# Patient Record
Sex: FEMALE | Race: WHITE | Employment: UNEMPLOYED | URBAN - METROPOLITAN AREA
[De-identification: names, ages, dates, MRNs, and addresses within clinical notes are randomized per-mention and may not be internally consistent; named-entity substitution may affect disease eponyms.]

---

## 2023-10-10 LAB
C. TRACHOMATIS, EXTERNAL RESULT: NEGATIVE
HEP B, EXTERNAL RESULT: NEGATIVE
HEPATITIS C ANTIBODY, EXTERNAL RESULT: POSITIVE
HIV, EXTERNAL RESULT: NON REACTIVE
N. GONORRHOEAE, EXTERNAL RESULT: NEGATIVE
RUBELLA TITER, EXTERNAL RESULT: NORMAL
T. PALLIDUM (SYPHILIS) ANTIBODY, EXTERNAL RESULT: NON REACTIVE

## 2024-02-02 LAB — GBS, EXTERNAL RESULT: NEGATIVE

## 2024-02-29 ENCOUNTER — ANESTHESIA EVENT (OUTPATIENT)
Dept: LABOR AND DELIVERY | Age: 34
End: 2024-02-29
Payer: COMMERCIAL

## 2024-02-29 ENCOUNTER — HOSPITAL ENCOUNTER (INPATIENT)
Age: 34
LOS: 2 days | Discharge: HOME | End: 2024-03-02
Attending: OBSTETRICS & GYNECOLOGY | Admitting: OBSTETRICS & GYNECOLOGY
Payer: COMMERCIAL

## 2024-02-29 ENCOUNTER — ANESTHESIA (OUTPATIENT)
Dept: LABOR AND DELIVERY | Age: 34
End: 2024-02-29
Payer: COMMERCIAL

## 2024-02-29 PROBLEM — Z37.9 NORMAL LABOR: Status: ACTIVE | Noted: 2024-02-29

## 2024-02-29 LAB
ABO + RH BLD: NORMAL
AMPHETAMINES UR QL SCN>1000 NG/ML: ABNORMAL
BARBITURATES UR QL SCN>200 NG/ML: ABNORMAL
BENZODIAZ UR QL SCN>200 NG/ML: ABNORMAL
BLD GP AB SCN SERPL QL: NORMAL
BUPRENORPHINE+NOR UR QL SCN: ABNORMAL
CANNABINOIDS UR QL SCN>50 NG/ML: ABNORMAL
COCAINE UR QL SCN: ABNORMAL
DEPRECATED RDW RBC AUTO: 13.2 % (ref 12.4–15.4)
DRUG SCREEN COMMENT UR-IMP: ABNORMAL
FENTANYL SCREEN, URINE: ABNORMAL
HCT VFR BLD AUTO: 37.3 % (ref 36–48)
HGB BLD-MCNC: 12.9 G/DL (ref 12–16)
MCH RBC QN AUTO: 32.1 PG (ref 26–34)
MCHC RBC AUTO-ENTMCNC: 34.5 G/DL (ref 31–36)
MCV RBC AUTO: 93 FL (ref 80–100)
METHADONE UR QL SCN>300 NG/ML: POSITIVE
OPIATES UR QL SCN>300 NG/ML: ABNORMAL
OXYCODONE UR QL SCN: ABNORMAL
PCP UR QL SCN>25 NG/ML: ABNORMAL
PH UR STRIP: 6.5 [PH]
PLATELET # BLD AUTO: 187 K/UL (ref 135–450)
PMV BLD AUTO: 10.9 FL (ref 5–10.5)
RBC # BLD AUTO: 4.01 M/UL (ref 4–5.2)
REAGIN+T PALLIDUM IGG+IGM SERPL-IMP: NORMAL
WBC # BLD AUTO: 12.7 K/UL (ref 4–11)

## 2024-02-29 PROCEDURE — 6370000000 HC RX 637 (ALT 250 FOR IP): Performed by: OBSTETRICS & GYNECOLOGY

## 2024-02-29 PROCEDURE — 6360000002 HC RX W HCPCS

## 2024-02-29 PROCEDURE — 80307 DRUG TEST PRSMV CHEM ANLYZR: CPT

## 2024-02-29 PROCEDURE — 1220000000 HC SEMI PRIVATE OB R&B

## 2024-02-29 PROCEDURE — 7200000001 HC VAGINAL DELIVERY

## 2024-02-29 PROCEDURE — 6360000002 HC RX W HCPCS: Performed by: NURSE ANESTHETIST, CERTIFIED REGISTERED

## 2024-02-29 PROCEDURE — 2580000003 HC RX 258: Performed by: OBSTETRICS & GYNECOLOGY

## 2024-02-29 PROCEDURE — 0HQ9XZZ REPAIR PERINEUM SKIN, EXTERNAL APPROACH: ICD-10-PCS | Performed by: OBSTETRICS & GYNECOLOGY

## 2024-02-29 PROCEDURE — 86780 TREPONEMA PALLIDUM: CPT

## 2024-02-29 PROCEDURE — 85027 COMPLETE CBC AUTOMATED: CPT

## 2024-02-29 PROCEDURE — 2500000003 HC RX 250 WO HCPCS

## 2024-02-29 PROCEDURE — 59025 FETAL NON-STRESS TEST: CPT

## 2024-02-29 PROCEDURE — 51701 INSERT BLADDER CATHETER: CPT

## 2024-02-29 PROCEDURE — 86901 BLOOD TYPING SEROLOGIC RH(D): CPT

## 2024-02-29 PROCEDURE — 86850 RBC ANTIBODY SCREEN: CPT

## 2024-02-29 PROCEDURE — 86900 BLOOD TYPING SEROLOGIC ABO: CPT

## 2024-02-29 PROCEDURE — 2500000003 HC RX 250 WO HCPCS: Performed by: NURSE ANESTHETIST, CERTIFIED REGISTERED

## 2024-02-29 RX ORDER — METHYLERGONOVINE MALEATE 0.2 MG/ML
200 INJECTION INTRAVENOUS PRN
Status: DISCONTINUED | OUTPATIENT
Start: 2024-02-29 | End: 2024-02-29

## 2024-02-29 RX ORDER — SODIUM CHLORIDE, SODIUM LACTATE, POTASSIUM CHLORIDE, AND CALCIUM CHLORIDE .6; .31; .03; .02 G/100ML; G/100ML; G/100ML; G/100ML
500 INJECTION, SOLUTION INTRAVENOUS PRN
Status: DISCONTINUED | OUTPATIENT
Start: 2024-02-29 | End: 2024-02-29

## 2024-02-29 RX ORDER — SODIUM CHLORIDE, SODIUM LACTATE, POTASSIUM CHLORIDE, AND CALCIUM CHLORIDE .6; .31; .03; .02 G/100ML; G/100ML; G/100ML; G/100ML
1000 INJECTION, SOLUTION INTRAVENOUS PRN
Status: DISCONTINUED | OUTPATIENT
Start: 2024-02-29 | End: 2024-02-29

## 2024-02-29 RX ORDER — ACETAMINOPHEN 500 MG
1000 TABLET ORAL EVERY 6 HOURS PRN
Status: DISCONTINUED | OUTPATIENT
Start: 2024-02-29 | End: 2024-03-02 | Stop reason: HOSPADM

## 2024-02-29 RX ORDER — ONDANSETRON 2 MG/ML
4 INJECTION INTRAMUSCULAR; INTRAVENOUS EVERY 6 HOURS PRN
Status: DISCONTINUED | OUTPATIENT
Start: 2024-02-29 | End: 2024-03-02 | Stop reason: HOSPADM

## 2024-02-29 RX ORDER — METHADONE HYDROCHLORIDE 10 MG/5ML
78 SOLUTION ORAL EVERY 4 HOURS PRN
COMMUNITY

## 2024-02-29 RX ORDER — BUPIVACAINE HYDROCHLORIDE 7.5 MG/ML
INJECTION, SOLUTION INTRASPINAL PRN
Status: DISCONTINUED | OUTPATIENT
Start: 2024-02-29 | End: 2024-02-29 | Stop reason: SDUPTHER

## 2024-02-29 RX ORDER — CARBOPROST TROMETHAMINE 250 UG/ML
250 INJECTION, SOLUTION INTRAMUSCULAR PRN
Status: DISCONTINUED | OUTPATIENT
Start: 2024-02-29 | End: 2024-02-29

## 2024-02-29 RX ORDER — TERBUTALINE SULFATE 1 MG/ML
0.25 INJECTION, SOLUTION SUBCUTANEOUS
Status: DISCONTINUED | OUTPATIENT
Start: 2024-02-29 | End: 2024-02-29

## 2024-02-29 RX ORDER — LANOLIN 100 %
OINTMENT (GRAM) TOPICAL PRN
Status: DISCONTINUED | OUTPATIENT
Start: 2024-02-29 | End: 2024-03-02 | Stop reason: HOSPADM

## 2024-02-29 RX ORDER — LIDOCAINE HYDROCHLORIDE 20 MG/ML
INJECTION, SOLUTION EPIDURAL; INFILTRATION; INTRACAUDAL; PERINEURAL PRN
Status: DISCONTINUED | OUTPATIENT
Start: 2024-02-29 | End: 2024-02-29 | Stop reason: SDUPTHER

## 2024-02-29 RX ORDER — NICOTINE 21 MG/24HR
1 PATCH, TRANSDERMAL 24 HOURS TRANSDERMAL DAILY
Status: DISCONTINUED | OUTPATIENT
Start: 2024-02-29 | End: 2024-03-02 | Stop reason: HOSPADM

## 2024-02-29 RX ORDER — FENTANYL CITRATE 50 UG/ML
INJECTION, SOLUTION INTRAMUSCULAR; INTRAVENOUS
Status: DISCONTINUED
Start: 2024-02-29 | End: 2024-02-29 | Stop reason: WASHOUT

## 2024-02-29 RX ORDER — IBUPROFEN 800 MG/1
800 TABLET ORAL EVERY 8 HOURS PRN
Status: DISCONTINUED | OUTPATIENT
Start: 2024-02-29 | End: 2024-03-02 | Stop reason: HOSPADM

## 2024-02-29 RX ORDER — SODIUM CHLORIDE 9 MG/ML
INJECTION, SOLUTION INTRAVENOUS PRN
Status: DISCONTINUED | OUTPATIENT
Start: 2024-02-29 | End: 2024-02-29

## 2024-02-29 RX ORDER — ONDANSETRON 2 MG/ML
4 INJECTION INTRAMUSCULAR; INTRAVENOUS EVERY 6 HOURS PRN
Status: DISCONTINUED | OUTPATIENT
Start: 2024-02-29 | End: 2024-02-29

## 2024-02-29 RX ORDER — LIDOCAINE HYDROCHLORIDE AND EPINEPHRINE 15; 5 MG/ML; UG/ML
INJECTION, SOLUTION EPIDURAL PRN
Status: DISCONTINUED | OUTPATIENT
Start: 2024-02-29 | End: 2024-02-29 | Stop reason: SDUPTHER

## 2024-02-29 RX ORDER — DOCUSATE SODIUM 100 MG/1
100 CAPSULE, LIQUID FILLED ORAL 2 TIMES DAILY
Status: DISCONTINUED | OUTPATIENT
Start: 2024-02-29 | End: 2024-03-02 | Stop reason: HOSPADM

## 2024-02-29 RX ORDER — SODIUM CHLORIDE 0.9 % (FLUSH) 0.9 %
5-40 SYRINGE (ML) INJECTION PRN
Status: DISCONTINUED | OUTPATIENT
Start: 2024-02-29 | End: 2024-03-02 | Stop reason: HOSPADM

## 2024-02-29 RX ORDER — SODIUM CHLORIDE 0.9 % (FLUSH) 0.9 %
5-40 SYRINGE (ML) INJECTION EVERY 12 HOURS SCHEDULED
Status: DISCONTINUED | OUTPATIENT
Start: 2024-02-29 | End: 2024-02-29

## 2024-02-29 RX ORDER — NALOXONE HYDROCHLORIDE 0.4 MG/ML
INJECTION, SOLUTION INTRAMUSCULAR; INTRAVENOUS; SUBCUTANEOUS PRN
Status: DISCONTINUED | OUTPATIENT
Start: 2024-02-29 | End: 2024-02-29 | Stop reason: HOSPADM

## 2024-02-29 RX ORDER — DOCUSATE SODIUM 100 MG/1
100 CAPSULE, LIQUID FILLED ORAL 2 TIMES DAILY
Status: DISCONTINUED | OUTPATIENT
Start: 2024-02-29 | End: 2024-02-29

## 2024-02-29 RX ORDER — MISOPROSTOL 200 UG/1
800 TABLET ORAL PRN
Status: DISCONTINUED | OUTPATIENT
Start: 2024-02-29 | End: 2024-02-29

## 2024-02-29 RX ORDER — SODIUM CHLORIDE 9 MG/ML
INJECTION, SOLUTION INTRAVENOUS PRN
Status: DISCONTINUED | OUTPATIENT
Start: 2024-02-29 | End: 2024-03-02 | Stop reason: HOSPADM

## 2024-02-29 RX ORDER — SODIUM CHLORIDE, SODIUM LACTATE, POTASSIUM CHLORIDE, CALCIUM CHLORIDE 600; 310; 30; 20 MG/100ML; MG/100ML; MG/100ML; MG/100ML
INJECTION, SOLUTION INTRAVENOUS CONTINUOUS
Status: DISCONTINUED | OUTPATIENT
Start: 2024-02-29 | End: 2024-02-29

## 2024-02-29 RX ORDER — SODIUM CHLORIDE 0.9 % (FLUSH) 0.9 %
5-40 SYRINGE (ML) INJECTION EVERY 12 HOURS SCHEDULED
Status: DISCONTINUED | OUTPATIENT
Start: 2024-02-29 | End: 2024-03-02 | Stop reason: HOSPADM

## 2024-02-29 RX ORDER — ACETAMINOPHEN 325 MG/1
650 TABLET ORAL EVERY 4 HOURS PRN
Status: DISCONTINUED | OUTPATIENT
Start: 2024-02-29 | End: 2024-02-29

## 2024-02-29 RX ORDER — SODIUM CHLORIDE 0.9 % (FLUSH) 0.9 %
5-40 SYRINGE (ML) INJECTION PRN
Status: DISCONTINUED | OUTPATIENT
Start: 2024-02-29 | End: 2024-02-29

## 2024-02-29 RX ADMIN — DOCUSATE SODIUM 100 MG: 100 CAPSULE, LIQUID FILLED ORAL at 21:06

## 2024-02-29 RX ADMIN — IBUPROFEN 800 MG: 800 TABLET, FILM COATED ORAL at 11:26

## 2024-02-29 RX ADMIN — LIDOCAINE HYDROCHLORIDE 5 ML: 20 INJECTION, SOLUTION EPIDURAL; INFILTRATION; INTRACAUDAL; PERINEURAL at 08:35

## 2024-02-29 RX ADMIN — ACETAMINOPHEN 1000 MG: 500 TABLET ORAL at 11:26

## 2024-02-29 RX ADMIN — IBUPROFEN 800 MG: 800 TABLET, FILM COATED ORAL at 21:06

## 2024-02-29 RX ADMIN — Medication 87.3 MILLI-UNITS/MIN: at 08:59

## 2024-02-29 RX ADMIN — Medication 10 UNITS: at 09:00

## 2024-02-29 RX ADMIN — SODIUM CHLORIDE, POTASSIUM CHLORIDE, SODIUM LACTATE AND CALCIUM CHLORIDE 1000 ML: 600; 310; 30; 20 INJECTION, SOLUTION INTRAVENOUS at 07:40

## 2024-02-29 RX ADMIN — BUPIVACAINE HYDROCHLORIDE IN DEXTROSE 1 ML: 7.5 INJECTION, SOLUTION SUBARACHNOID at 07:59

## 2024-02-29 RX ADMIN — LIDOCAINE HYDROCHLORIDE AND EPINEPHRINE 3 ML: 15; 5 INJECTION, SOLUTION EPIDURAL at 08:09

## 2024-02-29 ASSESSMENT — PAIN SCALES - GENERAL
PAINLEVEL_OUTOF10: 2
PAINLEVEL_OUTOF10: 3
PAINLEVEL_OUTOF10: 5

## 2024-02-29 ASSESSMENT — PAIN DESCRIPTION - PAIN TYPE
TYPE: ACUTE PAIN
TYPE: ACUTE PAIN

## 2024-02-29 ASSESSMENT — PAIN DESCRIPTION - LOCATION
LOCATION: PERINEUM
LOCATION: PERINEUM
LOCATION: ABDOMEN

## 2024-02-29 ASSESSMENT — PAIN DESCRIPTION - DESCRIPTORS
DESCRIPTORS: DULL;BURNING
DESCRIPTORS: BURNING;SHARP
DESCRIPTORS: CRAMPING

## 2024-02-29 ASSESSMENT — PAIN DESCRIPTION - ORIENTATION
ORIENTATION: MID;INNER
ORIENTATION: LOWER
ORIENTATION: MID;INNER

## 2024-02-29 ASSESSMENT — ENCOUNTER SYMPTOMS: SHORTNESS OF BREATH: 0

## 2024-02-29 ASSESSMENT — PAIN - FUNCTIONAL ASSESSMENT
PAIN_FUNCTIONAL_ASSESSMENT: ACTIVITIES ARE NOT PREVENTED

## 2024-02-29 ASSESSMENT — PAIN DESCRIPTION - FREQUENCY
FREQUENCY: INTERMITTENT
FREQUENCY: INTERMITTENT

## 2024-02-29 ASSESSMENT — PAIN DESCRIPTION - ONSET
ONSET: PROGRESSIVE
ONSET: PROGRESSIVE

## 2024-02-29 NOTE — CARE COORDINATION
Case Management Mom/Baby Assessment    Identifying Information    Mother of Baby: Zari Veliz   Mother's :1990    Father of Baby:Donte Veliz  Father's :     Baby's Name: Esvin Veliz  Delivery Date:24   Baby's Weight: 8lb  15.6oz  Apgar :8/9  Nursing concerns for baby:None  Week Gestation: 39 weeks 5 Days  Prenatal Care:[facility]  Baby's Urine or Cord Drug Screen Yes__x_  No___ Results: Positive for Methadone  PCP for baby: ANDREE Smith  Date of appt:         Time of Appt:    Reasoning for Referral: MOB has history of heroin abuse, on Methadone for the past 3 yrs.      Assessment Information    Discharge Address:[address] [place] [address]  Phone:[phone]    Resides with:FOB and their children    Emergency Contact:Cynthia Logan Phone:[phone]    Support System: and family    Other Children    Name:Tushar Veliz  :2020  Name:Donte Veliz Jr  :2021  Name:Linda Veliz :10/24/2022    MOB has 10 yr old Rolo Morris 2013( has a few days a week)    Custody:has custody of her 4 Lay children, and shared with her 10 yr old Rolo.    Father of Baby Involvement:Lives with MOB and their children.    Have you ever had contact with Children's Services (describe): was involved when Tushar was born and has been sober over 3 yrs.    Supplies: Requested assistance with car seat  Car Seat  Diapers  Crib/Bassinet  Feeding  [place]        Resources:  [facility]- has  Medicaid  Food [place]  [employer]ucceed[employer]- active with [employer]  Transportation   Cash Assistance     Education last grade completed 11th grade  Occupation not working    History   Domestic Abuse:No  Physical Abuse: No  Sexual Abuse:No  Drug Abuse/Prescription Medication:hx of heroin usage- on Methadone for over 3 yrs.  Depression:had post partum depression after the birth of her 2 yr old.  Medication/Counseling: [facility] has counselor and daily Methadone.  Does MOB have Medical Marijuana card?

## 2024-02-29 NOTE — ANESTHESIA PROCEDURE NOTES
CSE Block    Patient location during procedure: OB  Start time: 2/29/2024 7:59 AM  End time: 2/29/2024 8:13 AM  Reason for block: primary anesthetic  Staffing  Performed: resident/CRNA   Anesthesiologist: Chris Marin MD  Resident/CRNA: Rupal Toro APRN - CRNA  Performed by: Rupal Toro APRN - CRNA  Authorized by: Chris Marin MD    CSE  Patient position: sitting  Prep: ChloraPrep and site prepped and draped  Patient monitoring: continuous pulse ox and frequent blood pressure checks  Approach: midline  Provider prep: mask and sterile gloves  Spinal Needle  Needle type: pencil-tip   Needle gauge: 25 G  Needle length: 3.5 in  Epidural Needle  Injection technique: MELODIE saline  Needle type: Tuohy   Needle gauge: 17 G  Needle length: 3.5 in  Needle insertion depth: 7 cm  Location: lumbar (1-5)  Catheter  Catheter type: side hole  Catheter size: 19 G  Catheter at skin depth: 12 cm  Test dose: negative  [id number]  Hemodynamics: stable  Additional Notes  Consent:  Risks, benefits, and alternatives of neuraxial anesthesia were discussed and the patient was given the opportunity to ask questions. Informed consent was obtained.    Timeout:  A \"time out\" was performed immediately prior to the start of the epidural procedure.  The patient, site, procedure and provider were correctly identified.  Allergies were reviewed.  All members of the team and the patient participated in the time out.      Procedure:   Skin Local: Lidocaine 1% 2 mL Interspace: L3-L4    Technique: Loss of resistance with 1 mL of normal saline to expand the epidural space.  Spinal Needle: 25g Pencan  Paresthesia: denies    CSF:  positive    Heme: negative   SAB Medication: 0.75% bupivacaine  1 mL  Spinal needle removed and epidural catheter threaded without difficulty.  Aspiration:  CSF  negative  /Heme: negative   Dressing: Occlusive; steri strips, tegaderm and tape applied using aseptic technique.    Attempts: 1     Difficulties/Complications:

## 2024-02-29 NOTE — FLOWSHEET NOTE
Recovery completed. Vitals stable. Bleeding scant/small -1/u and firm. Pt reports some mild cramping rated 3/10 and agreeable to take motrin and tylenol at this time. Pt legs still numb/heavy and pt cannot lift off bed but does have improved movement. Pt eating without n/v. Pt denies needs at this time. Infant remains latched/eating. Call light in reach and spouse at bedside.

## 2024-02-29 NOTE — H&P
Department of Obstetrics and Gynecology   Obstetrics History and Physical        CHIEF COMPLAINT:  contractions    HISTORY OF PRESENT ILLNESS:    Zari Veliz  is a 34 y.o.  female at 39w5d presents with a chief complaint as above and is being admitted for active phase labor. Patient came in via EMS for contractions. Receives her care at [facility] but was too far away to make it to the hospital. Reports uncomplicated pregnancy and prior  x4. Hx of heroin but has been clean for 3 years, currently on methadone.    Estimated Due Date: Estimated Date of Delivery: 3/2/24    PRENATAL CARE: Complicated by: none    PAST OB HISTORY:  OB History          5    Para   4    Term   4            AB        Living   4         SAB        IAB        Ectopic        Molar        Multiple        Live Births   4              Past Medical History:        Diagnosis Date    Hepatitis C      Past Surgical History:    No past surgical history on file.  Allergies:  Patient has no known allergies.  Social History:    Social History     Socioeconomic History    Marital status:      Spouse name: Not on file    Number of children: Not on file    Years of education: Not on file    Highest education level: Not on file   Occupational History    Not on file   Tobacco Use    Smoking status: Former     Types: Cigarettes    Smokeless tobacco: Not on file   Vaping Use    Vaping Use: Every day    Devices: Disposable   Substance and Sexual Activity    Alcohol use: No    Drug use: Not Currently     Comment: Heroin - clean 3 years    Sexual activity: Yes     Partners: Male   Other Topics Concern    Not on file   Social History Narrative    Not on file     Social Determinants of Health     Financial Resource Strain: Not on file   Food Insecurity: Not on file   Transportation Needs: Not on file   Physical Activity: Not on file   Stress: Not on file   Social Connections: Not on file   Intimate Partner Violence: Not on file   Housing

## 2024-02-29 NOTE — FLOWSHEET NOTE
viable male infant at 0850. Infant currently skin to skin with mom and nursing. Vitals stable. Infant voided at delivery and had meconium stained fluid. Maternal vitals stable. 1st degree repair with 1 stitch. Fundus firm u/u. Plan of care reviewed and mother verbalized understanding. Recovery period started.

## 2024-02-29 NOTE — FLOWSHEET NOTE
Fundus boggy but quickly firmed with massage down to -1/u. Moderate sized clot and some bleeding noted. Pads weighed and approx 335 - total QBL now 485. Pads changed. Will monitor. BP 90/51 but pt asymptomatic. Charge BRENT Clark notified.

## 2024-02-29 NOTE — ANESTHESIA PRE PROCEDURE
Evaluation  Patient summary reviewed and Nursing notes reviewed   no history of anesthetic complications:   Airway: Mallampati: II  TM distance: >3 FB   Neck ROM: full  Mouth opening: > = 3 FB   Dental: normal exam         Pulmonary:Negative Pulmonary ROS and normal exam  breath sounds clear to auscultation      (-) asthma, shortness of breath and recent URI                           Cardiovascular:Negative CV ROS  Exercise tolerance: good (>4 METS)      (-) hypertension and CAD      Rhythm: regular  Rate: normal                    Neuro/Psych:   Negative Neuro/Psych ROS  (+) psychiatric history:            GI/Hepatic/Renal: Neg GI/Hepatic/Renal ROS  (+) hepatitis: C     (-) GERD       Endo/Other: Negative Endo/Other ROS       (-) diabetes mellitus                ROS comment: Hx of heroin abuse. Takes 78mg methadone daily. Follows with clinic.   Abdominal:             Vascular: negative vascular ROS.    - DVT and PE.      Other Findings:         Anesthesia Plan      CSE     ASA 2     (Plan for a labor CSE. Plan and risks discussed with patient including but not limited to changes in HR, B/P and oxygen status; N/V; infection; headache; inadequate block or need to replace epidural. Questions answered. Patient agrees to POC.         )        Anesthetic plan and risks discussed with patient.        Attending anesthesiologist reviewed and agrees with Preprocedure content        OB History          5    Para   4    Term   4            AB        Living   4         SAB        IAB        Ectopic        Molar        Multiple        Live Births   4                Zari Veliz is a 34 y.o. year-old female  admitted to Jaycee Gilliam MD for contractions and active labor. CVE per RN: 10/100/0. Pt requests epidural. Gestational age is 39w5d. Her Body mass index is 27.28 kg/m².     She was seen, examined and her chart was reviewed (including anesthesia, drug and allergy history).  No interval changes are noted

## 2024-02-29 NOTE — FLOWSHEET NOTE
Pt up to bathroom x2 standby assist @ 1245. Legs still tingly but able to lift off bed. Bleeding remains scant and fundus firm. Epi catheter removed with tip intact. Pt unable to void. Lurdes care instructions provided. New gown and pad/ice pack on. Pt to wheelchair and moved to  room 2263. Oriented to room/call light. Pt encouraged to call for assist up to bathroom as still tingly. Call light in reach and pt verbalized understanding.

## 2024-02-29 NOTE — FLOWSHEET NOTE
Patient desires epidural.  Anesthesia aware and at bedside.  Patient agreeable for procedure, verbal consent obtained and timeout performed.

## 2024-02-29 NOTE — FLOWSHEET NOTE
Patient laying back from epidural, placed in right tilt while epidural sets up. Patient is complete but does not feel pressure at this time.

## 2024-02-29 NOTE — L&D DELIVERY NOTE
Department of Obstetrics and Gynecology  Spontaneous Vaginal Delivery Note      Zari Veliz,[id number]    PRENATAL CARE: Complicated by: Methadone use    Pre-operative Diagnosis:  Term pregnancy, Single fetus, active labor, and SROM      Post-operative Diagnosis: The same    Additional Procedures: perineal laceration repair     Information for the patient's :  Jerman Veliz [[id number]]                  Infant Wt:   Information for the patient's :  Jerman Veliz [[id number]]          APGARS:     Information for the patient's :  Jerman Veliz [[id number]]         Anesthesia:  epidural anesthesia    Specimen:  Placenta sent to pathology No    Estimated blood loss: 150 cc     Condition: mother and infant stable in LDR    Infant Feeding: breast    Delivery Summary: Patient is Zari Veliz is a 34 y.o.  female at 39w5d admitted to Labor and Delivery room and placed on external monitors. Contractions were regular, FHT was reactive with moderate variability without decels. Patient did received epidural anesthesia.   Labor progressed as anticipated to fully dilated cervix. With subsequent contractions she started pushing and delivered vaginally spontaneously with no complications. 30 Units of Pitocin in 500 ml of LR was started IV. Placenta, cord and membranes were delivered spontaneously within less than 15 minutes. Uterus was not explored.  Vaginal walls, cervix, perineum, rectum were not intact on inspection. The cervix, vagina and perineum were examined and first degree perineal laceration was repaired in the regular fashion with Vicryl. Uterus was well contracted.Vaginal sweep was done, laps count was correct. Mother and  left stable to recovery.     Electronically signed by Jaycee Gilliam MD on 2024 at 9:09 AM

## 2024-02-29 NOTE — ANESTHESIA POSTPROCEDURE EVALUATION
Department of Anesthesiology  Postprocedure Note    Patient: Zari Veliz  MRN: [id number]  YOB: 1990  Date of evaluation: 2/29/2024    Procedure Summary       Date: 02/29/24 Room / Location:     Anesthesia Start: 0750 Anesthesia Stop: 0850    Procedure: Labor Analgesia Diagnosis:     Scheduled Providers:  Responsible Provider: Chris Marin MD    Anesthesia Type: CSE ASA Status: 2            Anesthesia Type: CSE      Anesthesia Post Evaluation    Patient location during evaluation: bedside  Patient participation: complete - patient participated  Level of consciousness: awake and alert  Pain score: 1  Airway patency: patent  Nausea & Vomiting: no nausea and no vomiting  Cardiovascular status: hemodynamically stable  Respiratory status: room air  Hydration status: stable  Multimodal analgesia pain management approach  Pain management: satisfactory to patient and adequate    /61   Pulse 74   Temp 36.7 °C (98 °F) (Oral)   Resp 16   Ht 1.6 m (5' 3\")   Wt 69.9 kg (154 lb)   SpO2 98%   Breastfeeding Unknown   BMI 27.28 kg/m²       No notable events documented.

## 2024-02-29 NOTE — FLOWSHEET NOTE
Patient to unit from squad to the ED, Patient taken to room 2287 due to patient uncomfortable and stating her water broke. Originally ED stated that Patient was 32 weeks but upon arriving patient states due date is March 2nd. Dr Gilliam notified.

## 2024-03-01 PROCEDURE — 6370000000 HC RX 637 (ALT 250 FOR IP): Performed by: OBSTETRICS & GYNECOLOGY

## 2024-03-01 PROCEDURE — 1220000000 HC SEMI PRIVATE OB R&B

## 2024-03-01 RX ORDER — SIMETHICONE 80 MG
80 TABLET,CHEWABLE ORAL EVERY 6 HOURS PRN
Status: DISCONTINUED | OUTPATIENT
Start: 2024-03-01 | End: 2024-03-02 | Stop reason: HOSPADM

## 2024-03-01 RX ORDER — CALCIUM CARBONATE 500 MG/1
500 TABLET, CHEWABLE ORAL 3 TIMES DAILY PRN
Status: DISCONTINUED | OUTPATIENT
Start: 2024-03-01 | End: 2024-03-02 | Stop reason: HOSPADM

## 2024-03-01 RX ADMIN — ACETAMINOPHEN 1000 MG: 500 TABLET ORAL at 16:34

## 2024-03-01 RX ADMIN — IBUPROFEN 800 MG: 800 TABLET, FILM COATED ORAL at 13:04

## 2024-03-01 RX ADMIN — ACETAMINOPHEN 1000 MG: 500 TABLET ORAL at 00:39

## 2024-03-01 RX ADMIN — IBUPROFEN 800 MG: 800 TABLET, FILM COATED ORAL at 20:09

## 2024-03-01 RX ADMIN — DOCUSATE SODIUM 100 MG: 100 CAPSULE, LIQUID FILLED ORAL at 09:49

## 2024-03-01 RX ADMIN — ANTACID TABLETS 500 MG: 500 TABLET, CHEWABLE ORAL at 19:26

## 2024-03-01 RX ADMIN — IBUPROFEN 800 MG: 800 TABLET, FILM COATED ORAL at 05:12

## 2024-03-01 RX ADMIN — DOCUSATE SODIUM 100 MG: 100 CAPSULE, LIQUID FILLED ORAL at 20:09

## 2024-03-01 RX ADMIN — SIMETHICONE 80 MG: 80 TABLET, CHEWABLE ORAL at 02:15

## 2024-03-01 ASSESSMENT — PAIN DESCRIPTION - ORIENTATION
ORIENTATION: LOWER
ORIENTATION: LOWER
ORIENTATION: MID
ORIENTATION: MID
ORIENTATION: LOWER
ORIENTATION: LOWER

## 2024-03-01 ASSESSMENT — PAIN DESCRIPTION - ONSET
ONSET: ON-GOING
ONSET: PROGRESSIVE
ONSET: ON-GOING

## 2024-03-01 ASSESSMENT — PAIN DESCRIPTION - FREQUENCY
FREQUENCY: INTERMITTENT

## 2024-03-01 ASSESSMENT — PAIN - FUNCTIONAL ASSESSMENT
PAIN_FUNCTIONAL_ASSESSMENT: ACTIVITIES ARE NOT PREVENTED

## 2024-03-01 ASSESSMENT — PAIN SCALES - GENERAL
PAINLEVEL_OUTOF10: 6
PAINLEVEL_OUTOF10: 0
PAINLEVEL_OUTOF10: 1
PAINLEVEL_OUTOF10: 1
PAINLEVEL_OUTOF10: 4
PAINLEVEL_OUTOF10: 2
PAINLEVEL_OUTOF10: 0
PAINLEVEL_OUTOF10: 0

## 2024-03-01 ASSESSMENT — PAIN DESCRIPTION - DESCRIPTORS
DESCRIPTORS: BURNING;SORE
DESCRIPTORS: CRAMPING
DESCRIPTORS: CRAMPING
DESCRIPTORS: SORE
DESCRIPTORS: CRAMPING
DESCRIPTORS: CRAMPING

## 2024-03-01 ASSESSMENT — PAIN DESCRIPTION - PAIN TYPE
TYPE: ACUTE PAIN

## 2024-03-01 ASSESSMENT — PAIN DESCRIPTION - LOCATION
LOCATION: ABDOMEN
LOCATION: ABDOMEN
LOCATION: PERINEUM
LOCATION: BACK
LOCATION: BACK
LOCATION: PERINEUM

## 2024-03-01 ASSESSMENT — PAIN DESCRIPTION - RADICULAR PAIN: RADICULAR_PAIN: ABSENT

## 2024-03-01 NOTE — FLOWSHEET NOTE
RN at bedside for shift report with BRENT Hameed. Whiteboard updated, POC discussed, Pt. Verbalized understanding. This RN to take over care at this time

## 2024-03-01 NOTE — PLAN OF CARE
Problem: Pain  Goal: Verbalizes/displays adequate comfort level or baseline comfort level  3/1/2024 1456 by Jennifer Najera, RN  Outcome: Progressing  3/1/2024 0532 by Breann Van, RN  Outcome: Progressing  Flowsheets  Taken 3/1/2024 0512  Verbalizes/displays adequate comfort level or baseline comfort level:   Encourage patient to monitor pain and request assistance   Assess pain using appropriate pain scale   Administer analgesics based on type and severity of pain and evaluate response   Implement non-pharmacological measures as appropriate and evaluate response   Consider cultural and social influences on pain and pain management   Notify Licensed Independent Practitioner if interventions unsuccessful or patient reports new pain  Taken 3/1/2024 0039  Verbalizes/displays adequate comfort level or baseline comfort level:   Encourage patient to monitor pain and request assistance   Assess pain using appropriate pain scale   Administer analgesics based on type and severity of pain and evaluate response   Implement non-pharmacological measures as appropriate and evaluate response   Consider cultural and social influences on pain and pain management   Notify Licensed Independent Practitioner if interventions unsuccessful or patient reports new pain  Taken 2/29/2024 2014  Verbalizes/displays adequate comfort level or baseline comfort level:   Encourage patient to monitor pain and request assistance   Assess pain using appropriate pain scale   Administer analgesics based on type and severity of pain and evaluate response   Implement non-pharmacological measures as appropriate and evaluate response   Consider cultural and social influences on pain and pain management   Notify Licensed Independent Practitioner if interventions unsuccessful or patient reports new pain     Problem: Postpartum  Goal: Experiences normal postpartum course  Description:  Postpartum OB-Pregnancy care plan goal which identifies if the mother

## 2024-03-01 NOTE — PLAN OF CARE
Problem: Pain  Goal: Verbalizes/displays adequate comfort level or baseline comfort level  Outcome: Progressing  Flowsheets  Taken 3/1/2024 0512  Verbalizes/displays adequate comfort level or baseline comfort level:   Encourage patient to monitor pain and request assistance   Assess pain using appropriate pain scale   Administer analgesics based on type and severity of pain and evaluate response   Implement non-pharmacological measures as appropriate and evaluate response   Consider cultural and social influences on pain and pain management   Notify Licensed Independent Practitioner if interventions unsuccessful or patient reports new pain  Taken 3/1/2024 0039  Verbalizes/displays adequate comfort level or baseline comfort level:   Encourage patient to monitor pain and request assistance   Assess pain using appropriate pain scale   Administer analgesics based on type and severity of pain and evaluate response   Implement non-pharmacological measures as appropriate and evaluate response   Consider cultural and social influences on pain and pain management   Notify Licensed Independent Practitioner if interventions unsuccessful or patient reports new pain  Taken 2/29/2024 2014  Verbalizes/displays adequate comfort level or baseline comfort level:   Encourage patient to monitor pain and request assistance   Assess pain using appropriate pain scale   Administer analgesics based on type and severity of pain and evaluate response   Implement non-pharmacological measures as appropriate and evaluate response   Consider cultural and social influences on pain and pain management   Notify Licensed Independent Practitioner if interventions unsuccessful or patient reports new pain     Problem: Postpartum  Goal: Experiences normal postpartum course  Description:  Postpartum OB-Pregnancy care plan goal which identifies if the mother is experiencing a normal postpartum course  Outcome: Progressing  Goal: Appropriate maternal -

## 2024-03-01 NOTE — DISCHARGE SUMMARY
Department of Obstetrics and Gynecology  Postpartum Discharge Summary      Admit Date: 2024    Admit Diagnosis: Normal labor @ 39 5/7 w, methadone maintenance     Discharge Date: 2024  Any delay in discharge from ordered D/C date due to  factors.    Discharge Diagnoses: spontaneous vaginal delivery       Medication List        CONTINUE taking these medications      methadone 10 MG/5ML solution              Service: Obstetrics    Consults: none    Significant Diagnostic Studies: none    Postpartum complications: none     Condition at Discharge: good    Hospital Course: uncomplicated    Discharge Instructions:     Activity: as tolerated    Diet: regular diet    Instructions: No intercourse and nothing in the vagina for 6 weeks. Do not drive while using pain medications. Keep any wounds clean and dry    Discharge to: Home    Disposition / Follow up: Return to office in 6 weeks    Home Health Nurse visit within 24-48 h if qualifies    [place] Data:  Apgars:  Information for the patient's :  Jerman Veliz [[id number]]   APGAR One: 8   Information for the patient's :  Jerman Veliz [[id number]]   APGAR Five: 9   Birth Weight:  Information for the patient's :  Jerman Veliz [[id number]]   Birth Weight: 4.07 kg (8 lb 15.6 oz)   Home with mother    Electronically signed by Gabby Arellano MD on 3/1/2024 at 2:57 PM

## 2024-03-01 NOTE — PROGRESS NOTES
Department of Obstetrics and Gynecology  Vaginal Delivery Postpartum Rounds    SUBJECTIVE:  Pain is controlled with non-steroidal anti-inflammatory drugs. Her lochia is normal.    OBJECTIVE:  Vital Signs: /69   Pulse 83   Temp 98.4 °F (36.9 °C) (Oral)   Resp 18   Ht 1.6 m (5' 3\")   Wt 69.9 kg (154 lb)   SpO2 98%   Breastfeeding Unknown   BMI 27.28 kg/m²   Appearance/Psychiatric: awake, alert, cooperative, no apparent distress, appears stated age  Constitutional: The patient is well nourished.  Cardiovascular: She does not have edema.  Respiratory: Respiratory effort is normal.  Gastrointestinal: Soft, non tender, uterine fundus is firm below umbilicus  Extremities: nontender to palpation  Perineum: intact     LABS / IMAGING:      Lab Results   Component Value Date/Time    WBC 12.7 02/29/2024 07:40 AM    RBC 4.01 02/29/2024 07:40 AM    HGB 12.9 02/29/2024 07:40 AM    HCT 37.3 02/29/2024 07:40 AM    MCV 93.0 02/29/2024 07:40 AM    MCH 32.1 02/29/2024 07:40 AM    MCHC 34.5 02/29/2024 07:40 AM    RDW 13.2 02/29/2024 07:40 AM     02/29/2024 07:40 AM    MPV 10.9 02/29/2024 07:40 AM     Lab Results   Component Value Date/Time     07/22/2010 04:30 AM    K 4.0 07/22/2010 04:30 AM     07/22/2010 04:30 AM    CO2 23 07/22/2010 04:30 AM    BUN 11 07/22/2010 04:30 AM    CREATININE 0.9 07/22/2010 04:30 AM    GLUCOSE 109 07/22/2010 04:30 AM    CALCIUM 9.8 07/22/2010 04:30 AM     No results found for: \"POCGLU\"  Lab Results   Component Value Date/Time    ALKPHOS 76 07/22/2010 04:30 AM    ALT 14 07/22/2010 04:30 AM    AST 23 07/22/2010 04:30 AM    PROT 7.9 07/22/2010 04:30 AM    BILITOT 0.90 07/22/2010 04:30 AM    LABALBU 4.5 07/22/2010 04:30 AM     Lab Results   Component Value Date/Time    COLORU Yellow 11/29/2013 03:53 PM    CLARITYU Clear 11/29/2013 03:53 PM    SPECGRAV 1.025 11/29/2013 03:53 PM    MUCUS Trace 07/22/2010 05:45 AM    PHUR 6.5 02/29/2024 09:05 AM    WBCUA 10-15 07/22/2010 05:45 AM

## 2024-03-01 NOTE — CARE COORDINATION
MOB asked for assistance getting Car Seat.  SW made referral to  Outreach Program(POP).  POP will deliver Car Seat to patient on Monday prior to discharge.

## 2024-03-01 NOTE — PROGRESS NOTES
[facility] Department of Anesthesiology  Post-Anesthesia Note       Name:  Zari Veliz                                         Age:  34 y.o.  MRN:  [id number]   39w5d  OB History          5    Para   5    Term   5            AB        Living   5         SAB        IAB        Ectopic        Molar        Multiple   0    Live Births   5                Anesthesia Post Op Pain Management: yes -       Zari Veliz was evaluated on postpartum day 1.  She expresses no concerns regarding her anesthesia care at this time.        Last Vitals & Oxygen Saturation: /69   Pulse 83   Temp 36.9 °C (98.4 °F) (Oral)   Resp 18   Ht 1.6 m (5' 3\")   Wt 69.9 kg (154 lb)   SpO2 98%   Breastfeeding Unknown   BMI 27.28 kg/m²   Patient Vitals for the past 4 hrs:   BP Temp Temp src Pulse Resp SpO2   24 0753 118/69 36.9 °C (98.4 °F) Oral 83 18 --   24 0512 121/74 36.6 °C (97.8 °F) Oral 74 16 98 %       Anesthesia: epidural    Level of consciousness: awake, alert, and oriented    Respiratory: stable     Cardiovascular: stable     Hydration: stable     PONV:  none    Pruritis: mild    Post Dural Puncture Headache: denies    Post-op pain: adequate analgesia    Out of bed and ambulating without difficulty: Yes    Post-op assessment: no apparent anesthetic complications and tolerated procedure well    Complications:   none      MARLYS Benjamin - CRNA  2024   7:57 AM

## 2024-03-02 VITALS
TEMPERATURE: 98 F | HEART RATE: 83 BPM | HEIGHT: 63 IN | DIASTOLIC BLOOD PRESSURE: 85 MMHG | WEIGHT: 154 LBS | SYSTOLIC BLOOD PRESSURE: 114 MMHG | OXYGEN SATURATION: 98 % | RESPIRATION RATE: 18 BRPM | BODY MASS INDEX: 27.29 KG/M2

## 2024-03-02 PROCEDURE — 6370000000 HC RX 637 (ALT 250 FOR IP): Performed by: OBSTETRICS & GYNECOLOGY

## 2024-03-02 RX ADMIN — IBUPROFEN 800 MG: 800 TABLET, FILM COATED ORAL at 08:10

## 2024-03-02 RX ADMIN — DOCUSATE SODIUM 100 MG: 100 CAPSULE, LIQUID FILLED ORAL at 08:10

## 2024-03-02 ASSESSMENT — PAIN SCALES - GENERAL: PAINLEVEL_OUTOF10: 0

## 2024-03-02 NOTE — DISCHARGE INSTRUCTIONS
Follow-up with your OB doctor as specified in 6 weeks.        For Breastfeeding moms, you can contact our lactation specialists with any problems or questions you may have.  [facility] lactation [id number]      DIET  Eat a well balanced diet focusing on foods high in fiber and protein.   Drink plenty of fluids especially water.  To avoid constipation you may take a mild stool softener as recommended by your doctor or midwife.    ACTIVITY  Gradually increase your activity.  Resume exercise regimen only after advise by your doctor or midwife.  Avoid lifting anything heavier than your baby or a gallon of milk for SIX weeks.   Avoid driving until your doctor or midwife has given their approval.  Rise slowly from a lying to sitting and then a standing position.  Climb stairs one at a time.  Use caution when carrying your baby up and down the stairs.  NO SEXUAL Activity for 4-6 weeks or until advised by your doctor; Nothing in vagina: intercourse, tampons, or douching.   Be prepared to discuss family planning at your follow-up OB visit.   You may feel tired or have a lack of energy.  You may continue your prenatal vitamin to replenish nutrients post delivery.  Nap when baby naps to catch up on sleep.     EMOTIONS  You may feed rolon, sad, teary, & overwhelmed.  Contact your OB provider if you feel you may be showing signs of postpartum depression, or have thoughts of harming yourself or your infant.  If infant will not stop crying, contact another adult for help or place infant in their crib on their back and take a break.  NEVER shake your infant.      BLEEDING  Vaginal bleeding will decrease in amount over the next few weeks.  You will notice that as your activity increases, your flow may increase.  This is your body's way of telling you, you need take things easier and rest more often.  Call your OB/ER if you are saturating more than one maxi pad in an hour & resting does help.    BREAST CARE  Take medications as

## 2024-03-02 NOTE — PLAN OF CARE
Problem: Pain  Goal: Verbalizes/displays adequate comfort level or baseline comfort level  3/1/2024 2128 by Emy Santoyo RN  Outcome: Progressing     Problem: Postpartum  Goal: Experiences normal postpartum course  Description:  Postpartum OB-Pregnancy care plan goal which identifies if the mother is experiencing a normal postpartum course  3/1/2024 2128 by Emy Santoyo RN  Outcome: Progressing     Problem: Postpartum  Goal: Appropriate maternal -  bonding  Description:  Postpartum OB-Pregnancy care plan goal which identifies if the mother and  are bonding appropriately  3/1/2024 2128 by Emy Santoyo RN  Outcome: Progressing     Problem: Postpartum  Goal: Establishment of infant feeding pattern  Description:  Postpartum OB-Pregnancy care plan goal which identifies if the mother is establishing a feeding pattern with their   3/1/2024 2128 by Emy Snatoyo RN  Outcome: Progressing     Problem: Postpartum  Goal: Incisions, wounds, or drain sites healing without S/S of infection  3/1/2024 2128 by Emy Santoyo RN  Outcome: Progressing     Problem: Infection - Adult  Goal: Absence of infection at discharge  3/1/2024 2128 by Emy Santoyo RN  Outcome: Progressing     Problem: Infection - Adult  Goal: Absence of infection during hospitalization  3/1/2024 2128 by Emy Santoyo RN  Outcome: Progressing     Problem: Infection - Adult  Goal: Absence of fever/infection during anticipated neutropenic period  3/1/2024 2128 by Emy Santoyo RN  Outcome: Progressing     Problem: Safety - Adult  Goal: Free from fall injury  3/1/2024 2128 by Emy Santoyo RN  Outcome: Progressing     Problem: Discharge Planning  Goal: Discharge to home or other facility with appropriate resources  3/1/2024 2128 by Emy Santoyo RN  Outcome: Progressing     Problem: Chronic Conditions and Co-morbidities  Goal: Patient's chronic conditions and co-morbidity symptoms are monitored and

## 2024-03-02 NOTE — FLOWSHEET NOTE
Postpartum teaching completed and copy of AVS given to patient. Patient verbalized understanding of all teaching points.  All questions answered.  Patient plans to follow-up with OB Provider as instructed. Patient rooming in room with .